# Patient Record
Sex: FEMALE | Race: WHITE | Employment: PART TIME | URBAN - METROPOLITAN AREA
[De-identification: names, ages, dates, MRNs, and addresses within clinical notes are randomized per-mention and may not be internally consistent; named-entity substitution may affect disease eponyms.]

---

## 2023-09-13 ENCOUNTER — CLINICAL DOCUMENTATION (OUTPATIENT)
Age: 68
End: 2023-09-13

## 2023-09-13 NOTE — PROGRESS NOTES
Patient came in stating that her PCP told her to come in for appt today with us in urgent care for question of DVT-stating leg and ankle pain and swelling. After speaking with Josephine Morris, patient was instructed she needed to be evaluated at the ER r/o DVT-patient verbally understood and said she would head over to the ER.